# Patient Record
(demographics unavailable — no encounter records)

---

## 2024-10-07 NOTE — REASON FOR VISIT
[Follow-Up Evaluation] : a follow-up evaluation for [Headache] : headache [Patient] : patient [Mother] : mother [Medical Records] : medical records [Other: _____] : [unfilled]

## 2024-10-07 NOTE — CONSULT LETTER
[Dear  ___] : Dear  [unfilled], [Consult Letter:] : I had the pleasure of evaluating your patient, [unfilled]. [Please see my note below.] : Please see my note below. [Consult Closing:] : Thank you very much for allowing me to participate in the care of this patient.  If you have any questions, please do not hesitate to contact me. [Sincerely,] : Sincerely, [FreeTextEntry3] : Nathan San M.D Pediatric neurology attending Neurofibromatosis clinic Co-director [facility] Tel: [phone] Fax: [phone]

## 2024-10-07 NOTE — ASSESSMENT
[FreeTextEntry1] : 8 year old girl who reports headaches at night before falling asleep, after reading in bed for 1-2 hours, since the school year started. She does not have morning headaches, no headaches that wake her up from sleep, no vomiting. Brain MRI in 2021 was normal. Exam non focal.   Discussed with mother (and father on speaker phone) regarding healthy eating and sleeping habits; provided printed information.  NSAIDs as needed for severe headaches, not to exceed twice a week to avoid rebound type headaches Discussed option of prophylaxis tx with Periactin, side effects reviewed  Headache diet

## 2024-10-07 NOTE — PLAN
[FreeTextEntry1] : Recommend follow up in 3-4 months; if no improvement, will obtain repeated imaging; sooner follow up if any red flags. All questions answered; mother reports understanding and agrees with plan

## 2024-10-07 NOTE — PHYSICAL EXAM
[Well-appearing] : well-appearing [No abnormal neurocutaneous stigmata or skin lesions] : no abnormal neurocutaneous stigmata or skin lesions [Alert] : alert [Well related, good eye contact] : well related, good eye contact [Conversant] : conversant [Normal speech and language] : normal speech and language [Follows instructions well] : follows instructions well [Pupils reactive to light and accommodation] : pupils reactive to light and accommodation [Full extraocular movements] : full extraocular movements [No nystagmus] : no nystagmus [No papilledema] : no papilledema [No facial asymmetry or weakness] : no facial asymmetry or weakness [Gross hearing intact] : gross hearing intact [Equal palate elevation] : equal palate elevation [Good shoulder shrug] : good shoulder shrug [Normal tongue movement] : normal tongue movement [No pronator drift] : no pronator drift [Normal finger tapping and fine finger movements] : normal finger tapping and fine finger movements [No abnormal involuntary movements] : no abnormal involuntary movements [5/5 strength in proximal and distal muscles of arms and legs] : 5/5 strength in proximal and distal muscles of arms and legs [Walks and runs well] : walks and runs well [Able to walk on heels] : able to walk on heels [Able to walk on toes] : able to walk on toes [2+ biceps] : 2+ biceps [Knee jerks] : knee jerks [No ankle clonus] : no ankle clonus [Bilaterally] : bilaterally [No dysmetria on FTNT] : no dysmetria on FTNT [Normal gait] : normal gait [Able to tandem well] : able to tandem well [Negative Romberg] : negative Romberg [de-identified] : awake, alert, in NAD; facial grimacing was noted [de-identified] : throat clear

## 2024-10-07 NOTE — HISTORY OF PRESENT ILLNESS
[FreeTextEntry1] : BREE was seen here once in Nov 2021 for daily HAs Brain MRI Dec 2021 normal; possible sinusitis vs allergies ___________________  10/07/2024  follow up Above reviewed with mother. Mother reports that since last visit here in Nov 2021 BREE did not complain of headaches. Mother reports BREE is c/o headaches for the past 1 month, since the school year started. Mother reports that BREE has trouble sleeping. Often, but not every night, not long after she gets to bed, she comes down to parents and c/o headaches. Mother reports that during the summer, BREE went to sleep later in the night but she was very tired from camp, and she used to fall asleep immediately. Since the school year started it takes her longer to fall asleep. She goes to bed at 8 pm and she stays awake, reading for 1-2 hours. Once she is done reading, she comes down to parents, telling them she has a HA, she is also c/o of her belly. Parents then try to figure out if it is related to being hungry, and they give her food. Eventually she falls asleep around 10 pm and mother wakes her up at 7 am. BREE is not getting HAs in school. She is not waking up from sleep with headaches and she does not wake up in the morning with headaches. In school, she gets mostly stomachaches. BREE used to go to the school nurse every day because of stomachaches but after having a conversation with the nurse, these frequent nurse visits stopped.  Mother is not giving NSAIDs for headaches. Sometimes parents are giving Benadryl to help her sleep. No vomiting. Mother is asking for advice regarding sleep. In 3rd grade; BREE reports that she is doing a lot of math and it is giving her a HA

## 2024-10-24 NOTE — PHYSICAL EXAM
[de-identified] : General: No acute distress Mental: Alert and oriented x3 Eyes: Conjunctivitis not seen Chest: Symmetric chest rise, no audible wheezing Skin: Bilateral lower extremities absent from rashes and ulcers Abdomen: No distention  Right wrist: intact skin, no swelling or ecchymosis, no deformity TTP ulnar aspect Nontender ROM intact M/U/R motor  SILT throughout [de-identified] : Right wrist xray shows possible physeal avulsion fracture of ulnar styloid. Possible delayed ossification of carpal bones.

## 2024-10-24 NOTE — DISCUSSION/SUMMARY
[de-identified] : 8 year old girl with 1 week of right wrist pain after injury, pain along the ulnar aspect. Xrays show possible physeal injury of ulnar styloid. Discussed with hand specialist. I have recommended lace-up wrist brace and refrain from sports activity until pain resolves. She will schedule followup as needed.

## 2024-10-24 NOTE — HISTORY OF PRESENT ILLNESS
[de-identified] : 8 year old female with right wrist pain after injury approximately 1 week ago. She fell while rollerblading. She reports pain ulnar aspect, radiating to radial aspect. There has not been swelling, bruising or deformity. She has pain with pressure. No prior wrist issues. She has been wearing a soft wrist brace.

## 2025-03-19 NOTE — ASSESSMENT
[FreeTextEntry1] : 8-year-old with sleep initiation disorder and sleep maintenance insomnia (primary insomnia vs. short sleeper)

## 2025-03-19 NOTE — HISTORY OF PRESENT ILLNESS
[FreeTextEntry1] : Longstanding history of sleep difficulties Family has tried: Benadryl, Mg, melatonin, sound machine, sleep training On a "good night" she may sleep ~ 6 hours On "bad nights" and on average she sleeps ~ 3 hours Schedule does not change weekdays vs. weekends Difficulty with falling asleep and also staying asleep Denies snoring, naps Bedtime is 8-8:30p, falls asleep around 9-10p Wakes up 7-8a Wakes up multiple times her night or comes into parents room that she is laying and still awake  Otherwise she is a healthy 8 year old girl.  Family hx: ANA LILIA, sleep apnea, father does not sleep many hours (needs about 5 hours of sleep a night)

## 2025-03-19 NOTE — END OF VISIT
[FreeTextEntry3] : I, Dr. Bailey, personally performed the evaluation and management (E/M) services for this new patient.? That E/M includes conducting the clinically appropriate initial history &/or exam, assessing all conditions, and establishing the plan of care.? Today, my FERNANDO, Heather Palladino, was here to observe my evaluation and management service for this patient & follow plan of care established by me going forward. [Time Spent: ___ minutes] : I have spent [unfilled] minutes of time on the encounter which excludes teaching and separately reported services.

## 2025-03-19 NOTE — PLAN
[FreeTextEntry1] : Doxepin titration up to 10mg/1mL for the remainder of the school year, take a break during the Summer Melatonin 3mg QHS with doxepin Mg glycinate 200mg QHS Vitamin D 1000 IU QHS Iron supplement in the AM with vitamin C, separate from calcium  Strict sleep hygiene and schedules discussed in length Follow up 3 months

## 2025-03-19 NOTE — CONSULT LETTER
[Dear  ___] : Dear  [unfilled], [Consult Letter:] : I had the pleasure of evaluating your patient, [unfilled]. [Please see my note below.] : Please see my note below. [Consult Closing:] : Thank you very much for allowing me to participate in the care of this patient.  If you have any questions, please do not hesitate to contact me. [Sincerely,] : Sincerely, [FreeTextEntry3] : Christine Palladino, CPNP Department of Pediatric Neurology [facility]  [address], [address] Tel: [phone] Fax: [phone]

## 2025-05-21 NOTE — HISTORY OF PRESENT ILLNESS
[FreeTextEntry1] : 8-year-old with sleep initiation disorder and sleep maintenance insomnia (primary insomnia vs. short sleeper) her for follow up  Recommendations at last visit: Doxepin titration up to 10mg/1mL for the remainder of the school year, take a break during the Summer Melatonin 3mg QHS with doxepin Mg glycinate 200mg QHS Vitamin D 1000 IU QHS Iron supplement in the AM with vitamin C, separate from calcium  Strict sleep hygiene and schedules discussed in length Follow up 3 months  Interval hx: Doing very well, sleeping through the night. Mood and emotions have improved during the day. --------------------------------------------------------------------------- Chart review: Longstanding history of sleep difficulties Family has tried: Benadryl, Mg, melatonin, sound machine, sleep training On a "good night" she may sleep ~ 6 hours On "bad nights" and on average she sleeps ~ 3 hours Schedule does not change weekdays vs. weekends Difficulty with falling asleep and also staying asleep Denies snoring, naps Bedtime is 8-8:30p, falls asleep around 9-10p Wakes up 7-8a Wakes up multiple times her night or comes into parents room that she is laying and still awake  Otherwise she is a healthy 8 year old girl.  Family hx: ANA LILIA, sleep apnea, father does not sleep many hours (needs about 5 hours of sleep a night)

## 2025-05-21 NOTE — REASON FOR VISIT
[Follow-Up Evaluation] : a follow-up evaluation for [Parents] : parents [Medical Records] : medical records [Insomnia] : insomnia [Mother] : mother

## 2025-05-21 NOTE — END OF VISIT
[FreeTextEntry3] : I, Dr. Bailey, personally performed the evaluation and management (E/M) services for this established patient who presents today with (a) new problem(s)/exacerbation of (an) existing condition(s).? That E/M includes conducting the clinically appropriate interval history &/or exam, assessing all new/exacerbated conditions, and establishing a new plan of care.? Today, my FERNANDO, Christine Palladino, was here to observe my evaluation and management service for this new problem/exacerbated condition and follow the plan of care established by me going forward. [Time Spent: ___ minutes] : I have spent [unfilled] minutes of time on the encounter which excludes teaching and separately reported services.

## 2025-05-21 NOTE — PLAN
[FreeTextEntry1] : May start wean of Doxepin to test if she can take a break during the Summer Melatonin 3mg QHS with doxepin Mg glycinate 200mg QHS Vitamin D 1000 IU QHS Iron supplement in the AM with vitamin C, separate from calcium  Strict sleep hygiene and schedules discussed in length Follow up 3 months

## 2025-05-21 NOTE — PHYSICAL EXAM
[Well-appearing] : well-appearing [Normocephalic] : normocephalic [No dysmorphic facial features] : no dysmorphic facial features [No ocular abnormalities] : no ocular abnormalities [Neck supple] : neck supple [Lungs clear] : lungs clear [Heart sounds regular in rate and rhythm] : heart sounds regular in rate and rhythm [Soft] : soft [No organomegaly] : no organomegaly [No abnormal neurocutaneous stigmata or skin lesions] : no abnormal neurocutaneous stigmata or skin lesions [Straight] : straight [No mariza or dimples] : no mariza or dimples [No deformities] : no deformities [Alert] : alert [Well related, good eye contact] : well related, good eye contact [Conversant] : conversant [Normal speech and language] : normal speech and language [Follows instructions well] : follows instructions well [VFF] : VFF [Pupils reactive to light and accommodation] : pupils reactive to light and accommodation [Full extraocular movements] : full extraocular movements [No nystagmus] : no nystagmus [No papilledema] : no papilledema [Normal facial sensation to light touch] : normal facial sensation to light touch [No facial asymmetry or weakness] : no facial asymmetry or weakness [Gross hearing intact] : gross hearing intact [Equal palate elevation] : equal palate elevation [Good shoulder shrug] : good shoulder shrug [Normal tongue movement] : normal tongue movement [Midline tongue, no fasciculations] : midline tongue, no fasciculations [Normal axial and appendicular muscle tone] : normal axial and appendicular muscle tone [Gets up on table without difficulty] : gets up on table without difficulty [No pronator drift] : no pronator drift [Normal finger tapping and fine finger movements] : normal finger tapping and fine finger movements [No abnormal involuntary movements] : no abnormal involuntary movements [5/5 strength in proximal and distal muscles of arms and legs] : 5/5 strength in proximal and distal muscles of arms and legs [Walks and runs well] : walks and runs well [Able to do deep knee bend] : able to do deep knee bend [Able to walk on heels] : able to walk on heels [Able to walk on toes] : able to walk on toes [2+ biceps] : 2+ biceps [Triceps] : triceps [Knee jerks] : knee jerks [Ankle jerks] : ankle jerks [No ankle clonus] : no ankle clonus [Localizes LT and temperature] : localizes LT and temperature [No dysmetria on FTNT] : no dysmetria on FTNT [Good walking balance] : good walking balance [Normal gait] : normal gait [Able to tandem well] : able to tandem well [Negative Romberg] : negative Romberg

## 2025-05-21 NOTE — CONSULT LETTER
[Dear  ___] : Dear  [unfilled], [Consult Letter:] : I had the pleasure of evaluating your patient, [unfilled]. [Please see my note below.] : Please see my note below. [Consult Closing:] : Thank you very much for allowing me to participate in the care of this patient.  If you have any questions, please do not hesitate to contact me. [Sincerely,] : Sincerely, [FreeTextEntry3] : Christine Palladino, CPNP Department of Pediatric Neurology [facility]  [address], [address] Tel: [phone] Fax: [phone]

## 2025-05-21 NOTE — CONSULT LETTER
[Dear  ___] : Dear  [unfilled], [Consult Letter:] : I had the pleasure of evaluating your patient, [unfilled]. [Please see my note below.] : Please see my note below. [Consult Closing:] : Thank you very much for allowing me to participate in the care of this patient.  If you have any questions, please do not hesitate to contact me. [Sincerely,] : Sincerely, [FreeTextEntry3] : Christine Palladino, CPNP Department of Pediatric Neurology [facility]  [address], [address] Tel: [phone] Fax: [phone]

## 2025-05-21 NOTE — ASSESSMENT
[FreeTextEntry1] : 8-year-old with sleep initiation disorder and sleep maintenance insomnia (primary insomnia vs. short sleeper) improved on Doxepin.